# Patient Record
Sex: MALE | Race: WHITE | Employment: FULL TIME | URBAN - METROPOLITAN AREA
[De-identification: names, ages, dates, MRNs, and addresses within clinical notes are randomized per-mention and may not be internally consistent; named-entity substitution may affect disease eponyms.]

---

## 2017-01-28 PROCEDURE — 36415 COLL VENOUS BLD VENIPUNCTURE: CPT | Performed by: FAMILY MEDICINE

## 2017-01-28 PROCEDURE — 83704 LIPOPROTEIN BLD QUAN PART: CPT | Performed by: FAMILY MEDICINE

## 2017-11-07 PROCEDURE — 87340 HEPATITIS B SURFACE AG IA: CPT | Performed by: FAMILY MEDICINE

## 2017-11-07 PROCEDURE — 86803 HEPATITIS C AB TEST: CPT | Performed by: FAMILY MEDICINE

## 2017-11-07 PROCEDURE — 86706 HEP B SURFACE ANTIBODY: CPT | Performed by: FAMILY MEDICINE

## 2018-07-27 ENCOUNTER — HOSPITAL ENCOUNTER (EMERGENCY)
Facility: HOSPITAL | Age: 43
Discharge: HOME | End: 2018-07-28
Payer: COMMERCIAL

## 2018-07-27 ENCOUNTER — APPOINTMENT (OUTPATIENT)
Dept: GENERAL RADIOLOGY | Facility: HOSPITAL | Age: 43
End: 2018-07-27
Payer: COMMERCIAL

## 2018-07-27 DIAGNOSIS — S86.011A ACHILLES TENDON RUPTURE, RIGHT, INITIAL ENCOUNTER: Primary | ICD-10-CM

## 2018-07-27 PROCEDURE — 99283 EMERGENCY DEPT VISIT LOW MDM: CPT

## 2018-07-27 PROCEDURE — 29515 APPLICATION SHORT LEG SPLINT: CPT

## 2018-07-27 PROCEDURE — 73610 X-RAY EXAM OF ANKLE: CPT

## 2018-07-27 RX ORDER — HYDROCODONE BITARTRATE AND ACETAMINOPHEN 7.5; 325 MG/1; MG/1
1-2 TABLET ORAL EVERY 4 HOURS PRN
Qty: 10 TABLET | Refills: 0 | Status: SHIPPED | OUTPATIENT
Start: 2018-07-27 | End: 2018-08-03

## 2018-07-28 VITALS
TEMPERATURE: 99 F | HEART RATE: 81 BPM | RESPIRATION RATE: 17 BRPM | BODY MASS INDEX: 32.58 KG/M2 | OXYGEN SATURATION: 99 % | HEIGHT: 69 IN | SYSTOLIC BLOOD PRESSURE: 139 MMHG | WEIGHT: 220 LBS | DIASTOLIC BLOOD PRESSURE: 91 MMHG

## 2018-07-28 NOTE — ED PROVIDER NOTES
Patient Seen in: [facility], [place] AND [facility] Emergency Department    History   Patient presents with: Ankle Injury    Stated Complaint: lower extremity injury     HPI    Patient presents to the emergency department complaining of injuring his ankle.   He states he palpable Achilles tendon and no plantar flexion of the foot with calf squeeze. The knee and ankle themselves are intact. Strong pulses are noted in the foot. Capillary refill is brisk and less than 2 seconds.    Neurological: He is alert and oriented to

## 2018-07-28 NOTE — ED INITIAL ASSESSMENT (HPI)
Right ankle injury appx 2 hours pta, states he felt \"pop\" when stepping on right foot, has pain to right ankle radiating up right leg.

## 2018-07-30 PROBLEM — S86.011A RUPTURE OF RIGHT ACHILLES TENDON, INITIAL ENCOUNTER: Status: ACTIVE | Noted: 2018-07-30

## 2018-08-24 PROBLEM — Z98.890 S/P ACHILLES TENDON REPAIR: Status: ACTIVE | Noted: 2018-08-24

## 2020-09-17 PROBLEM — R74.8 ABNORMAL TRANSAMINASES: Status: ACTIVE | Noted: 2020-09-17

## 2020-09-17 PROBLEM — F32.89 OTHER DEPRESSION: Status: ACTIVE | Noted: 2020-09-17

## 2020-09-17 PROBLEM — E66.9 OBESITY, CLASS II, BMI 35-39.9, NO COMORBIDITY: Status: ACTIVE | Noted: 2020-09-17

## 2020-11-06 PROBLEM — R22.31 MASS OF FINGER OF RIGHT HAND: Status: ACTIVE | Noted: 2020-11-06

## 2020-12-16 PROCEDURE — 88305 TISSUE EXAM BY PATHOLOGIST: CPT | Performed by: ORTHOPAEDIC SURGERY

## 2020-12-16 PROCEDURE — 88341 IMHCHEM/IMCYTCHM EA ADD ANTB: CPT | Performed by: ORTHOPAEDIC SURGERY

## 2020-12-16 PROCEDURE — 88342 IMHCHEM/IMCYTCHM 1ST ANTB: CPT | Performed by: ORTHOPAEDIC SURGERY

## 2021-11-09 PROBLEM — K76.0 FATTY LIVER: Status: ACTIVE | Noted: 2021-11-09

## (undated) NOTE — ED AVS SNAPSHOT
Nan Alvarez   MRN: [id number]    Department:  [facility] Emergency Department   Date of Visit:  7/27/2018           Disclosure     Insurance plans vary and the physician(s) referred by the ER may not be covered by your plan.  Please contact you CARE PHYSICIAN AT ONCE OR RETURN IMMEDIATELY TO THE EMERGENCY DEPARTMENT. If you have been prescribed any medication(s), please fill your prescription right away and begin taking the medication(s) as directed.   If you believe that any of the medications